# Patient Record
Sex: MALE | URBAN - METROPOLITAN AREA
[De-identification: names, ages, dates, MRNs, and addresses within clinical notes are randomized per-mention and may not be internally consistent; named-entity substitution may affect disease eponyms.]

---

## 2020-12-22 ENCOUNTER — TRANSFERRED RECORDS (OUTPATIENT)
Dept: HEALTH INFORMATION MANAGEMENT | Facility: CLINIC | Age: 40
End: 2020-12-22

## 2020-12-30 ENCOUNTER — MEDICAL CORRESPONDENCE (OUTPATIENT)
Dept: HEALTH INFORMATION MANAGEMENT | Facility: CLINIC | Age: 40
End: 2020-12-30

## 2021-01-06 ENCOUNTER — TRANSCRIBE ORDERS (OUTPATIENT)
Dept: OTHER | Age: 41
End: 2021-01-06

## 2021-01-06 DIAGNOSIS — G47.10 HYPERSOMNIA WITH SLEEP APNEA: ICD-10-CM

## 2021-01-06 DIAGNOSIS — G47.21 DELAYED SLEEP PHASE SYNDROME: Primary | ICD-10-CM

## 2021-01-06 DIAGNOSIS — Z72.820 SLEEP DEPRIVATION: ICD-10-CM

## 2021-01-06 DIAGNOSIS — G47.33 OBSTRUCTIVE SLEEP APNEA: ICD-10-CM

## 2021-01-06 DIAGNOSIS — G47.30 HYPERSOMNIA WITH SLEEP APNEA: ICD-10-CM

## 2021-01-14 ENCOUNTER — TELEPHONE (OUTPATIENT)
Dept: OTOLARYNGOLOGY | Facility: CLINIC | Age: 41
End: 2021-01-14

## 2021-01-14 NOTE — TELEPHONE ENCOUNTER
LVM after being unable to reach patient on multiple tries. Left direct line for patient to call and schedule appointment.. Stated I am attempting to schedule patient for Inspire Device consult with Dr. Adams.

## 2021-01-26 ENCOUNTER — TELEPHONE (OUTPATIENT)
Dept: OTOLARYNGOLOGY | Facility: CLINIC | Age: 41
End: 2021-01-26

## 2021-01-26 NOTE — TELEPHONE ENCOUNTER
LVM after being unable to reach patient on multiple attempts.. Left direct line for patient to call and schedule appointment. Stated I am attempting to schedule patient for Inspire Device consult with Dr. Adams and that we have received his sleep study records.

## 2021-02-03 ENCOUNTER — TELEPHONE (OUTPATIENT)
Dept: OTOLARYNGOLOGY | Facility: CLINIC | Age: 41
End: 2021-02-03

## 2021-02-03 NOTE — TELEPHONE ENCOUNTER
"LVM after being unable to reach patient on multiple attempts. Left ENT scheduling and direct line for patient to call and schedule appointment. Stated I am attempting to schedule patient for Inspire Device consult with Dr. Adams and that we have received his sleep study records    SCHEDULING INSTRUCTIONS: Inform patient based of sleep study done at [facility] in 2018, he does not qualify for Inspire Device. He is able to still see Dr. Adams and request another sleep study if he wishes however. Please schedule patient for new appointment with Dr. Adams in [facility], can be video or in person. Please include \"pt requested Inspire Device Consult but does not qualify based on results of sleep study in 2018 but wishes to seek other treatment options for sleep apnea\" in appointment notes.    Thank you.  "